# Patient Record
Sex: FEMALE | Race: WHITE | NOT HISPANIC OR LATINO | Employment: UNEMPLOYED | URBAN - NONMETROPOLITAN AREA
[De-identification: names, ages, dates, MRNs, and addresses within clinical notes are randomized per-mention and may not be internally consistent; named-entity substitution may affect disease eponyms.]

---

## 2017-04-21 ENCOUNTER — ALLSCRIPTS OFFICE VISIT (OUTPATIENT)
Dept: FAMILY MEDICINE CLINIC | Facility: CLINIC | Age: 27
End: 2017-04-21
Payer: COMMERCIAL

## 2017-04-21 PROCEDURE — T1015 CLINIC SERVICE: HCPCS | Performed by: PHYSICIAN ASSISTANT

## 2018-01-13 VITALS
HEART RATE: 67 BPM | BODY MASS INDEX: 31.24 KG/M2 | OXYGEN SATURATION: 98 % | WEIGHT: 183 LBS | TEMPERATURE: 97.3 F | SYSTOLIC BLOOD PRESSURE: 118 MMHG | HEIGHT: 64 IN | DIASTOLIC BLOOD PRESSURE: 80 MMHG | RESPIRATION RATE: 16 BRPM

## 2022-05-06 ENCOUNTER — APPOINTMENT (EMERGENCY)
Dept: CT IMAGING | Facility: HOSPITAL | Age: 32
End: 2022-05-06
Payer: COMMERCIAL

## 2022-05-06 ENCOUNTER — HOSPITAL ENCOUNTER (EMERGENCY)
Facility: HOSPITAL | Age: 32
Discharge: HOME | End: 2022-05-06
Attending: EMERGENCY MEDICINE
Payer: COMMERCIAL

## 2022-05-06 VITALS
HEART RATE: 58 BPM | BODY MASS INDEX: 23.44 KG/M2 | WEIGHT: 132.28 LBS | OXYGEN SATURATION: 100 % | DIASTOLIC BLOOD PRESSURE: 67 MMHG | SYSTOLIC BLOOD PRESSURE: 122 MMHG | TEMPERATURE: 98.1 F | RESPIRATION RATE: 16 BRPM | HEIGHT: 63 IN

## 2022-05-06 DIAGNOSIS — H53.9 VISUAL DISTURBANCE: ICD-10-CM

## 2022-05-06 DIAGNOSIS — J01.00 SINUSITIS, ACUTE MAXILLARY: Primary | ICD-10-CM

## 2022-05-06 DIAGNOSIS — R42 LIGHTHEADED: ICD-10-CM

## 2022-05-06 LAB
ANION GAP SERPL CALCULATED.3IONS-SCNC: 7 MMOL/L (ref 4–13)
BASOPHILS # BLD AUTO: 0.1 THOUSANDS/ΜL (ref 0–0.1)
BASOPHILS NFR BLD AUTO: 1 % (ref 0–1)
BILIRUB UR QL STRIP: NEGATIVE
BUN SERPL-MCNC: 8 MG/DL (ref 5–25)
CALCIUM SERPL-MCNC: 8.7 MG/DL (ref 8.3–10.1)
CHLORIDE SERPL-SCNC: 104 MMOL/L (ref 100–108)
CLARITY UR: CLEAR
CO2 SERPL-SCNC: 29 MMOL/L (ref 21–32)
COLOR UR: YELLOW
CREAT SERPL-MCNC: 0.75 MG/DL (ref 0.6–1.3)
EOSINOPHIL # BLD AUTO: 0.28 THOUSAND/ΜL (ref 0–0.61)
EOSINOPHIL NFR BLD AUTO: 4 % (ref 0–6)
ERYTHROCYTE [DISTWIDTH] IN BLOOD BY AUTOMATED COUNT: 13.5 % (ref 11.6–15.1)
EXT PREG TEST URINE: NEGATIVE
EXT. CONTROL ED NAV: NORMAL
GFR SERPL CREATININE-BSD FRML MDRD: 104 ML/MIN/1.73SQ M
GLUCOSE SERPL-MCNC: 49 MG/DL (ref 65–140)
GLUCOSE UR STRIP-MCNC: NEGATIVE MG/DL
HCT VFR BLD AUTO: 42.6 % (ref 34.8–46.1)
HGB BLD-MCNC: 13.7 G/DL (ref 11.5–15.4)
HGB UR QL STRIP.AUTO: NEGATIVE
IMM GRANULOCYTES # BLD AUTO: 0.02 THOUSAND/UL (ref 0–0.2)
IMM GRANULOCYTES NFR BLD AUTO: 0 % (ref 0–2)
KETONES UR STRIP-MCNC: NEGATIVE MG/DL
LEUKOCYTE ESTERASE UR QL STRIP: NEGATIVE
LYMPHOCYTES # BLD AUTO: 2.34 THOUSANDS/ΜL (ref 0.6–4.47)
LYMPHOCYTES NFR BLD AUTO: 29 % (ref 14–44)
MCH RBC QN AUTO: 30.7 PG (ref 26.8–34.3)
MCHC RBC AUTO-ENTMCNC: 32.2 G/DL (ref 31.4–37.4)
MCV RBC AUTO: 96 FL (ref 82–98)
MONOCYTES # BLD AUTO: 0.41 THOUSAND/ΜL (ref 0.17–1.22)
MONOCYTES NFR BLD AUTO: 5 % (ref 4–12)
NEUTROPHILS # BLD AUTO: 4.93 THOUSANDS/ΜL (ref 1.85–7.62)
NEUTS SEG NFR BLD AUTO: 61 % (ref 43–75)
NITRITE UR QL STRIP: NEGATIVE
NRBC BLD AUTO-RTO: 0 /100 WBCS
PH UR STRIP.AUTO: 6.5 [PH]
PLATELET # BLD AUTO: 256 THOUSANDS/UL (ref 149–390)
PMV BLD AUTO: 10.5 FL (ref 8.9–12.7)
POTASSIUM SERPL-SCNC: 3.6 MMOL/L (ref 3.5–5.3)
PROT UR STRIP-MCNC: NEGATIVE MG/DL
RBC # BLD AUTO: 4.46 MILLION/UL (ref 3.81–5.12)
SODIUM SERPL-SCNC: 140 MMOL/L (ref 136–145)
SP GR UR STRIP.AUTO: 1.02 (ref 1–1.03)
TSH SERPL DL<=0.05 MIU/L-ACNC: 0.34 UIU/ML (ref 0.45–4.5)
UROBILINOGEN UR QL STRIP.AUTO: 0.2 E.U./DL
WBC # BLD AUTO: 8.08 THOUSAND/UL (ref 4.31–10.16)

## 2022-05-06 PROCEDURE — 36415 COLL VENOUS BLD VENIPUNCTURE: CPT | Performed by: EMERGENCY MEDICINE

## 2022-05-06 PROCEDURE — 93005 ELECTROCARDIOGRAM TRACING: CPT

## 2022-05-06 PROCEDURE — 81025 URINE PREGNANCY TEST: CPT | Performed by: EMERGENCY MEDICINE

## 2022-05-06 PROCEDURE — 85025 COMPLETE CBC W/AUTO DIFF WBC: CPT | Performed by: EMERGENCY MEDICINE

## 2022-05-06 PROCEDURE — 80048 BASIC METABOLIC PNL TOTAL CA: CPT | Performed by: EMERGENCY MEDICINE

## 2022-05-06 PROCEDURE — 70450 CT HEAD/BRAIN W/O DYE: CPT

## 2022-05-06 PROCEDURE — 99285 EMERGENCY DEPT VISIT HI MDM: CPT

## 2022-05-06 PROCEDURE — 81003 URINALYSIS AUTO W/O SCOPE: CPT | Performed by: EMERGENCY MEDICINE

## 2022-05-06 PROCEDURE — 99285 EMERGENCY DEPT VISIT HI MDM: CPT | Performed by: EMERGENCY MEDICINE

## 2022-05-06 PROCEDURE — 84443 ASSAY THYROID STIM HORMONE: CPT | Performed by: EMERGENCY MEDICINE

## 2022-05-06 PROCEDURE — G1004 CDSM NDSC: HCPCS

## 2022-05-06 RX ORDER — AZITHROMYCIN 250 MG/1
TABLET, FILM COATED ORAL
Qty: 6 TABLET | Refills: 0 | Status: SHIPPED | OUTPATIENT
Start: 2022-05-06 | End: 2022-05-10

## 2022-05-06 RX ORDER — FLUTICASONE PROPIONATE 50 MCG
1 SPRAY, SUSPENSION (ML) NASAL DAILY
Qty: 16 G | Refills: 0 | Status: SHIPPED | OUTPATIENT
Start: 2022-05-06

## 2022-05-06 RX ADMIN — SODIUM CHLORIDE, SODIUM LACTATE, POTASSIUM CHLORIDE, AND CALCIUM CHLORIDE 1000 ML: .6; .31; .03; .02 INJECTION, SOLUTION INTRAVENOUS at 14:59

## 2022-05-06 NOTE — ED PROVIDER NOTES
History  Chief Complaint   Patient presents with    Chest Pain     pt states she has been having difficulty in her vision over the past three days  pt states she feels more tired than normal and c/o chest pain starting yesterday   Blurred Vision     Patient is a 68-year-old female states she has noted a spot described as a backwards see that is located when she looks at objects  There is no loss of vision or difficulty with her vision but states she keep seen this spot continuously  She does have a history of migraine headaches but states she does not associate this visual complaint with any headaches  Also complains of feeling lightheaded  Patient states when she stands up that she is to experience a sensation passing out  Has not had any syncopal events  No other chronic medical problems other than asthma  Patient does not drink alcohol but does smoke cigarettes and marijuana  No recent head injury  No recent chiropractic maneuvers or recent injury or illness  No history of DVT or PE  Prior to Admission Medications   Prescriptions Last Dose Informant Patient Reported? Taking? albuterol (PROVENTIL HFA,VENTOLIN HFA) 90 mcg/act inhaler   Yes Yes   Sig: Inhale 2 puffs every 6 (six) hours as needed for wheezing  Facility-Administered Medications: None       Past Medical History:   Diagnosis Date    Asthma     Migraines        Past Surgical History:   Procedure Laterality Date     SECTION         History reviewed  No pertinent family history  I have reviewed and agree with the history as documented      E-Cigarette/Vaping    E-Cigarette Use Never User      E-Cigarette/Vaping Substances     Social History     Tobacco Use    Smoking status: Current Every Day Smoker     Packs/day: 1 50    Smokeless tobacco: Never Used   Vaping Use    Vaping Use: Never used   Substance Use Topics    Alcohol use: Not Currently    Drug use: Yes     Types: Marijuana       Review of Systems Constitutional: Negative for appetite change, chills, fatigue, fever and unexpected weight change  HENT: Negative for congestion, ear pain, rhinorrhea and sore throat  Eyes: Positive for visual disturbance  Negative for pain  Respiratory: Negative for cough, chest tightness, shortness of breath and wheezing  Cardiovascular: Negative for chest pain, palpitations and leg swelling  Gastrointestinal: Negative for abdominal pain, constipation, diarrhea, nausea and vomiting  Genitourinary: Negative for difficulty urinating, dysuria, frequency, hematuria, menstrual problem, pelvic pain, vaginal bleeding and vaginal discharge  Musculoskeletal: Negative for arthralgias, back pain and neck pain  Skin: Negative for color change and rash  Neurological: Positive for light-headedness  Negative for dizziness, seizures, syncope and headaches  Psychiatric/Behavioral: Negative for confusion and sleep disturbance  All other systems reviewed and are negative  Physical Exam  Physical Exam  Vitals and nursing note reviewed  Constitutional:       General: She is not in acute distress  Appearance: She is well-developed and normal weight  She is not ill-appearing, toxic-appearing or diaphoretic  HENT:      Head: Normocephalic and atraumatic  Eyes:      General: No scleral icterus  Extraocular Movements: Extraocular movements intact  Conjunctiva/sclera: Conjunctivae normal    Neck:      Thyroid: No thyromegaly  Vascular: No JVD  Cardiovascular:      Rate and Rhythm: Normal rate and regular rhythm  Pulses:           Carotid pulses are 2+ on the right side and 2+ on the left side  Radial pulses are 2+ on the right side and 2+ on the left side  Dorsalis pedis pulses are 2+ on the right side and 2+ on the left side  Posterior tibial pulses are 2+ on the right side and 2+ on the left side  Heart sounds: Normal heart sounds  No murmur heard  No friction rub   No gallop  Pulmonary:      Effort: Pulmonary effort is normal  No respiratory distress  Breath sounds: Normal breath sounds  No decreased breath sounds, wheezing, rhonchi or rales  Chest:      Chest wall: Tenderness present  No mass or deformity  Abdominal:      Palpations: Abdomen is soft  There is no hepatomegaly or mass  Tenderness: There is no abdominal tenderness  There is no guarding or rebound  Hernia: No hernia is present  Musculoskeletal:         General: Normal range of motion  Cervical back: Normal range of motion and neck supple  Right lower leg: No tenderness  No edema  Left lower leg: No tenderness  No edema  Lymphadenopathy:      Cervical: No cervical adenopathy  Skin:     General: Skin is warm and dry  Capillary Refill: Capillary refill takes less than 2 seconds  Coloration: Skin is not cyanotic or pale  Findings: No rash  Nails: There is no clubbing  Neurological:      General: No focal deficit present  Mental Status: She is alert and oriented to person, place, and time     Psychiatric:         Mood and Affect: Mood normal          Behavior: Behavior normal          Vital Signs  ED Triage Vitals   Temperature Pulse Respirations Blood Pressure SpO2   05/06/22 1410 05/06/22 1410 05/06/22 1407 05/06/22 1410 05/06/22 1410   98 1 °F (36 7 °C) 64 16 122/67 99 %      Temp Source Heart Rate Source Patient Position - Orthostatic VS BP Location FiO2 (%)   05/06/22 1410 05/06/22 1410 05/06/22 1410 05/06/22 1410 --   Temporal Monitor Lying Left arm       Pain Score       05/06/22 1407       8           Vitals:    05/06/22 1515 05/06/22 1545 05/06/22 1600 05/06/22 1615   BP:       Pulse: 61 57 70 58   Patient Position - Orthostatic VS:             Visual Acuity      ED Medications  Medications   lactated ringers bolus 1,000 mL (0 mL Intravenous Stopped 5/6/22 1635)       Diagnostic Studies  Results Reviewed     Procedure Component Value Units Date/Time    Basic metabolic panel [[id number]]  (Abnormal) Collected: 05/06/22 1458    Lab Status: Final result Specimen: Blood from Arm, Right Updated: 05/06/22 1535     Sodium 140 mmol/L      Potassium 3 6 mmol/L      Chloride 104 mmol/L      CO2 29 mmol/L      ANION GAP 7 mmol/L      BUN 8 mg/dL      Creatinine 0 75 mg/dL      Glucose 49 mg/dL      Calcium 8 7 mg/dL      eGFR 104 ml/min/1 73sq m     Narrative:      [place] guidelines for Chronic Kidney Disease (CKD):     Stage 1 with normal or high GFR (GFR > 90 mL/min/1 73 square meters)    Stage 2 Mild CKD (GFR = 60-89 mL/min/1 73 square meters)    Stage 3A Moderate CKD (GFR = 45-59 mL/min/1 73 square meters)    Stage 3B Moderate CKD (GFR = 30-44 mL/min/1 73 square meters)    Stage 4 Severe CKD (GFR = 15-29 mL/min/1 73 square meters)    Stage 5 End Stage CKD (GFR <15 mL/min/1 73 square meters)  Note: GFR calculation is accurate only with a steady state creatinine    TSH [[id number]]  (Abnormal) Collected: 05/06/22 1458    Lab Status: Final result Specimen: Blood from Arm, Right Updated: 05/06/22 1535     TSH 3RD GENERATON 0 339 uIU/mL     Narrative:      Patients undergoing fluorescein dye angiography may retain small amounts of fluorescein in the body for 48-72 hours post procedure  Samples containing fluorescein can produce falsely depressed TSH values  If the patient had this procedure,a specimen should be resubmitted post fluorescein clearance        UA w Reflex to Microscopic w Reflex to Culture [[id number]] Collected: 05/06/22 1503    Lab Status: Final result Specimen: Urine, Clean Catch Updated: 05/06/22 1512     Color, UA Yellow     Clarity, UA Clear     Specific Gravity, UA 1 025     pH, UA 6 5     Leukocytes, UA Negative     Nitrite, UA Negative     Protein, UA Negative mg/dl      Glucose, UA Negative mg/dl      Ketones, UA Negative mg/dl      Urobilinogen, UA 0 2 E U /dl      Bilirubin, UA Negative     Blood, UA Negative CBC and differential [[id number]] Collected: 05/06/22 1458    Lab Status: Final result Specimen: Blood from Arm, Right Updated: 05/06/22 1510     WBC 8 08 Thousand/uL      RBC 4 46 Million/uL      Hemoglobin 13 7 g/dL      Hematocrit 42 6 %      MCV 96 fL      MCH 30 7 pg      MCHC 32 2 g/dL      RDW 13 5 %      MPV 10 5 fL      Platelets 370 Thousands/uL      nRBC 0 /100 WBCs      Neutrophils Relative 61 %      Immat GRANS % 0 %      Lymphocytes Relative 29 %      Monocytes Relative 5 %      Eosinophils Relative 4 %      Basophils Relative 1 %      Neutrophils Absolute 4 93 Thousands/µL      Immature Grans Absolute 0 02 Thousand/uL      Lymphocytes Absolute 2 34 Thousands/µL      Monocytes Absolute 0 41 Thousand/µL      Eosinophils Absolute 0 28 Thousand/µL      Basophils Absolute 0 10 Thousands/µL     POCT pregnancy, urine [[id number]]  (Normal) Resulted: 05/06/22 1504    Lab Status: Final result Updated: 05/06/22 1504     EXT PREG TEST UR (Ref: Negative) NEGATIVE     Control VALID                 CT head without contrast   Final Result by Tonya Quiroz MD (05/06 1546)      No acute intracranial abnormality  Small air-fluid level in the right maxillary sinus which may reflect acute sinusitis              Workstation performed: [id number]                    Procedures  ECG 12 Lead Documentation Only    Date/Time: 5/6/2022 3:23 PM  Performed by: Irineo Omalley DO  Authorized by: Irineo Omalley DO     Indications / Diagnosis:  Lightheaded  ECG reviewed by me, the ED Provider: yes    Patient location:  ED  Rate:     ECG rate:  56    ECG rate assessment: bradycardic    Rhythm:     Rhythm: sinus rhythm    Ectopy:     Ectopy: none    QRS:     QRS axis:  Normal    QRS intervals:  Normal  Conduction:     Conduction: normal    ST segments:     ST segments:  Normal  T waves:     T waves: normal               ED Course        ambulatory in the emergency department upon discharge without difficulty  MDM    Disposition  Final diagnoses:   Sinusitis, acute maxillary   Lightheaded   Visual disturbance     Time reflects when diagnosis was documented in both MDM as applicable and the Disposition within this note     Time User Action Codes Description Comment    5/6/2022  4:35 PM Jean-Pierre Filler T Add [J01 00] Sinusitis, acute maxillary     5/6/2022  4:40 PM Jean-Pierre Filler T Add [R42] Lightheaded     5/6/2022  4:40 PM Arvis Riedel Add [H53 9] Visual disturbance       ED Disposition     ED Disposition Condition Date/Time Comment    Discharge Stable Fri May 6, 2022  4:35 PM Lion Ese discharge to home/self care  Follow-up Information     Follow up With Specialties Details Why Contact Info    Osito Samuel PA-C Family Medicine, Physician Assistant Schedule an appointment as soon as possible for a visit   [address] [facility] [address]  [phone]      [facility] Ophthalmology Schedule an appointment as soon as possible for a visit   [address] [facility] [address]  [phone]            Patient's Medications   Discharge Prescriptions    AZITHROMYCIN (ZITHROMAX) 250 MG TABLET    Take 2 tablets today then 1 tablet daily x 4 days       Start Date: 5/6/2022  End Date: 5/10/2022       Order Dose: --       Quantity: 6 tablet    Refills: 0    FLUTICASONE (FLONASE) 50 MCG/ACT NASAL SPRAY    1 spray into each nostril daily       Start Date: 5/6/2022  End Date: --       Order Dose: 1 spray       Quantity: 16 g    Refills: 0       No discharge procedures on file      PDMP Review     None          ED Provider  Electronically Signed by           Ladan Mcghee DO  05/06/22 8343

## 2022-05-09 LAB
ATRIAL RATE: 68 BPM
P AXIS: 49 DEGREES
PR INTERVAL: 114 MS
QRS AXIS: 70 DEGREES
QRSD INTERVAL: 80 MS
QT INTERVAL: 412 MS
QTC INTERVAL: 438 MS
T WAVE AXIS: 69 DEGREES
VENTRICULAR RATE: 68 BPM

## 2022-05-09 PROCEDURE — 93010 ELECTROCARDIOGRAM REPORT: CPT | Performed by: INTERNAL MEDICINE

## 2022-05-11 LAB
ATRIAL RATE: 56 BPM
P AXIS: 54 DEGREES
PR INTERVAL: 120 MS
QRS AXIS: 73 DEGREES
QRSD INTERVAL: 82 MS
QT INTERVAL: 428 MS
QTC INTERVAL: 413 MS
T WAVE AXIS: 72 DEGREES
VENTRICULAR RATE: 56 BPM

## 2022-05-11 PROCEDURE — 93010 ELECTROCARDIOGRAM REPORT: CPT | Performed by: INTERNAL MEDICINE

## 2023-06-13 ENCOUNTER — OFFICE VISIT (OUTPATIENT)
Dept: URGENT CARE | Facility: CLINIC | Age: 33
End: 2023-06-13
Payer: COMMERCIAL

## 2023-06-13 VITALS
OXYGEN SATURATION: 100 % | DIASTOLIC BLOOD PRESSURE: 56 MMHG | SYSTOLIC BLOOD PRESSURE: 117 MMHG | HEIGHT: 66 IN | WEIGHT: 153 LBS | RESPIRATION RATE: 18 BRPM | BODY MASS INDEX: 24.59 KG/M2 | TEMPERATURE: 98.5 F | HEART RATE: 76 BPM

## 2023-06-13 DIAGNOSIS — R10.30 LOWER ABDOMINAL PAIN: Primary | ICD-10-CM

## 2023-06-13 LAB
SL AMB  POCT GLUCOSE, UA: NEGATIVE
SL AMB LEUKOCYTE ESTERASE,UA: NEGATIVE
SL AMB POCT BILIRUBIN,UA: NEGATIVE
SL AMB POCT BLOOD,UA: NEGATIVE
SL AMB POCT CLARITY,UA: CLEAR
SL AMB POCT COLOR,UA: YELLOW
SL AMB POCT KETONES,UA: NEGATIVE
SL AMB POCT NITRITE,UA: NEGATIVE
SL AMB POCT PH,UA: 6.5
SL AMB POCT SPECIFIC GRAVITY,UA: 1.03
SL AMB POCT URINE HCG: NEGATIVE
SL AMB POCT URINE PROTEIN: NEGATIVE
SL AMB POCT UROBILINOGEN: 0.2

## 2023-06-13 PROCEDURE — 99212 OFFICE O/P EST SF 10 MIN: CPT | Performed by: PHYSICIAN ASSISTANT

## 2023-06-13 PROCEDURE — 81002 URINALYSIS NONAUTO W/O SCOPE: CPT | Performed by: PHYSICIAN ASSISTANT

## 2023-06-13 PROCEDURE — S9088 SERVICES PROVIDED IN URGENT: HCPCS | Performed by: PHYSICIAN ASSISTANT

## 2023-06-13 PROCEDURE — 81025 URINE PREGNANCY TEST: CPT | Performed by: PHYSICIAN ASSISTANT

## 2023-06-13 NOTE — PROGRESS NOTES
[facility]        NAME: Nima Olguin is a 26 y o  female  : 1990    MRN: [id number]  DATE: 2023  TIME: 4:37 PM    Assessment and Plan   Lower abdominal pain [R10 30]  1  Lower abdominal pain  POCT urine HCG    POCT urine dip            Patient Instructions       Follow up with PCP in 3-5 days  Proceed to  ER if symptoms worsen  Chief Complaint     Chief Complaint   Patient presents with   • Abdominal Pain     Lower abdominal pain     • Heartburn         History of Present Illness       Patient presents with a 3 day hx of periumbilical and lower abd pain  Pain is worsening with time  8/10 pain  Increases with movement  Denies Fever, chills, N/V/D  Having heartburn at night  Denies pregnancy, had tubes tied  Denies urinary sx  Review of Systems   Review of Systems   Constitutional: Negative for chills and fever  Gastrointestinal: Positive for abdominal pain  Negative for diarrhea, nausea and vomiting  Genitourinary: Negative for difficulty urinating           Current Medications       Current Outpatient Medications:   •  albuterol (PROVENTIL HFA,VENTOLIN HFA) 90 mcg/act inhaler, Inhale 2 puffs every 6 (six) hours as needed for wheezing , Disp: , Rfl:   •  fluticasone (FLONASE) 50 mcg/act nasal spray, 1 spray into each nostril daily, Disp: 16 g, Rfl: 0    Current Allergies     Allergies as of 2023 - Reviewed 2023   Allergen Reaction Noted   • Aspirin Anaphylaxis 2016   • Darvon [propoxyphene] Anaphylaxis 2016   • Augmentin [amoxicillin-pot clavulanate] Vomiting 2023            The following portions of the patient's history were reviewed and updated as appropriate: allergies, current medications, past family history, past medical history, past social history, past surgical history and problem list      Past Medical History:   Diagnosis Date   • Asthma    • Migraines        Past Surgical History:   Procedure Laterality Date   •  SECTION     • "TUBAL LIGATION  2017       History reviewed  No pertinent family history  Medications have been verified  Objective   /56   Pulse 76   Temp 98 5 °F (36 9 °C)   Resp 18   Ht 5' 6\" (1 676 m)   Wt 69 4 kg (153 lb)   SpO2 100%   BMI 24 69 kg/m²   No LMP recorded  Physical Exam     Physical Exam  Vitals and nursing note reviewed  Constitutional:       Appearance: She is well-developed  HENT:      Head: Normocephalic and atraumatic  Cardiovascular:      Rate and Rhythm: Normal rate and regular rhythm  Pulmonary:      Effort: Pulmonary effort is normal    Abdominal:      Tenderness: There is abdominal tenderness in the right lower quadrant and periumbilical area  Positive signs include psoas sign  Neurological:      Mental Status: She is alert                     "

## 2023-07-21 ENCOUNTER — OFFICE VISIT (OUTPATIENT)
Dept: FAMILY MEDICINE CLINIC | Facility: CLINIC | Age: 33
End: 2023-07-21
Payer: COMMERCIAL

## 2023-07-21 VITALS
DIASTOLIC BLOOD PRESSURE: 68 MMHG | TEMPERATURE: 98.6 F | OXYGEN SATURATION: 99 % | SYSTOLIC BLOOD PRESSURE: 120 MMHG | WEIGHT: 142.6 LBS | BODY MASS INDEX: 24.34 KG/M2 | HEART RATE: 66 BPM | HEIGHT: 64 IN

## 2023-07-21 DIAGNOSIS — L40.9 PSORIASIS: ICD-10-CM

## 2023-07-21 DIAGNOSIS — R39.9 UTI SYMPTOMS: ICD-10-CM

## 2023-07-21 DIAGNOSIS — G43.109 MIGRAINE WITH AURA AND WITHOUT STATUS MIGRAINOSUS, NOT INTRACTABLE: ICD-10-CM

## 2023-07-21 DIAGNOSIS — R10.11 RUQ PAIN: ICD-10-CM

## 2023-07-21 DIAGNOSIS — F41.9 ANXIETY: ICD-10-CM

## 2023-07-21 DIAGNOSIS — J45.40 MODERATE PERSISTENT ASTHMA, UNSPECIFIED WHETHER COMPLICATED: Primary | ICD-10-CM

## 2023-07-21 PROCEDURE — 99205 OFFICE O/P NEW HI 60 MIN: CPT | Performed by: FAMILY MEDICINE

## 2023-07-21 RX ORDER — SUMATRIPTAN 25 MG/1
25 TABLET, FILM COATED ORAL ONCE AS NEEDED
Qty: 12 TABLET | Refills: 5 | Status: SHIPPED | OUTPATIENT
Start: 2023-07-21

## 2023-07-21 RX ORDER — DIAPER,BRIEF,INFANT-TODD,DISP
EACH MISCELLANEOUS 2 TIMES DAILY
Qty: 56 G | Refills: 5 | Status: SHIPPED | OUTPATIENT
Start: 2023-07-21

## 2023-07-21 RX ORDER — VENLAFAXINE HYDROCHLORIDE 37.5 MG/1
37.5 CAPSULE, EXTENDED RELEASE ORAL
Qty: 30 CAPSULE | Refills: 5 | Status: SHIPPED | OUTPATIENT
Start: 2023-07-21

## 2023-07-21 RX ORDER — ALBUTEROL SULFATE 90 UG/1
2 AEROSOL, METERED RESPIRATORY (INHALATION) EVERY 6 HOURS PRN
Qty: 6.7 G | Refills: 5 | Status: SHIPPED | OUTPATIENT
Start: 2023-07-21

## 2023-07-21 RX ORDER — ALBUTEROL SULFATE 90 UG/1
2 AEROSOL, METERED RESPIRATORY (INHALATION)
COMMUNITY

## 2023-07-21 RX ORDER — DIAPER,BRIEF,INFANT-TODD,DISP
EACH MISCELLANEOUS 2 TIMES DAILY
COMMUNITY
End: 2023-07-21 | Stop reason: SDUPTHER

## 2023-07-21 NOTE — PROGRESS NOTES
Name: Jayla Bejarano      : 1990      MRN: [id number]  Encounter Provider: Roxanne Saul MD  Encounter Date: 2023   Encounter department: [address] [facility]     1. Moderate persistent asthma, unspecified whether complicated  -     albuterol (PROVENTIL HFA,VENTOLIN HFA) 90 mcg/act inhaler; Inhale 2 puffs every 6 (six) hours as needed for wheezing    2. Psoriasis  -     hydrocortisone 0.5 % cream; Apply topically 2 (two) times a day    3. UTI symptoms  -     UA (URINE) with reflex to Scope    4. RUQ pain  -     US right upper quadrant; Future; Expected date: 2023    5. Migraine with aura and without status migrainosus, not intractable  -     SUMAtriptan (Imitrex) 25 mg tablet; Take 1 tablet (25 mg total) by mouth once as needed for migraine for up to 1 dose    6. Anxiety  -     venlafaxine (EFFEXOR-XR) 37.5 mg 24 hr capsule; Take 1 capsule (37.5 mg total) by mouth daily with breakfast           Subjective      She has 4-5 headaches a week. She has difficulty with certain smells. She has photophobia and phonophobia. She has had HA since the age of 14. Current HA worse than normal.  She has not taken a prophylactic medication. Her significant other just left the family. She has 4 children. 2 of her kids are on the Spectrum. She has RUQ pain that radiates into her right back. She has nausea with it. She has subjective fevers. She has no dysuria, frequency or urgency. Review of Systems   Gastrointestinal: Positive for abdominal pain and nausea. All other systems reviewed and are negative. Current Outpatient Medications on File Prior to Visit   Medication Sig   • albuterol (PROVENTIL HFA,VENTOLIN HFA) 90 mcg/act inhaler Inhale 2 puffs   • [DISCONTINUED] albuterol (PROVENTIL HFA,VENTOLIN HFA) 90 mcg/act inhaler Inhale 2 puffs every 6 (six) hours as needed for wheezing.    • [DISCONTINUED] hydrocortisone 0.5 % cream Apply topically 2 (two) times a day   • [DISCONTINUED] fluticasone (FLONASE) 50 mcg/act nasal spray 1 spray into each nostril daily (Patient not taking: Reported on 7/21/2023)       Objective     /68 (BP Location: Right arm, Patient Position: Sitting, Cuff Size: Standard)   Pulse 66   Temp 98.6 °F (37 °C) (Temporal)   Ht 5' 4" (1.626 m)   Wt 64.7 kg (142 lb 9.6 oz)   SpO2 99%   BMI 24.48 kg/m²     Physical Exam  Vitals and nursing note reviewed. Constitutional:       Appearance: She is well-developed and normal weight. Abdominal:      General: Abdomen is flat, scaphoid and protuberant. Bowel sounds are normal.      Palpations: Abdomen is rigid. Tenderness: There is abdominal tenderness in the right upper quadrant. Positive signs include Bowman's sign. Skin:     General: Skin is warm and dry. Neurological:      Mental Status: She is alert.        Heri Borrero MD

## 2023-09-12 ENCOUNTER — OFFICE VISIT (OUTPATIENT)
Dept: FAMILY MEDICINE CLINIC | Facility: CLINIC | Age: 33
End: 2023-09-12
Payer: COMMERCIAL

## 2023-09-12 VITALS
RESPIRATION RATE: 18 BRPM | OXYGEN SATURATION: 98 % | SYSTOLIC BLOOD PRESSURE: 102 MMHG | DIASTOLIC BLOOD PRESSURE: 64 MMHG | HEART RATE: 83 BPM | TEMPERATURE: 98.9 F | WEIGHT: 143.8 LBS | BODY MASS INDEX: 24.68 KG/M2

## 2023-09-12 DIAGNOSIS — J01.00 ACUTE NON-RECURRENT MAXILLARY SINUSITIS: ICD-10-CM

## 2023-09-12 DIAGNOSIS — J30.2 SEASONAL ALLERGIES: Primary | ICD-10-CM

## 2023-09-12 DIAGNOSIS — S16.1XXA STRAIN OF NECK MUSCLE, INITIAL ENCOUNTER: ICD-10-CM

## 2023-09-12 DIAGNOSIS — J04.0 LARYNGITIS: ICD-10-CM

## 2023-09-12 DIAGNOSIS — R05.8 PRODUCTIVE COUGH: ICD-10-CM

## 2023-09-12 PROCEDURE — 99214 OFFICE O/P EST MOD 30 MIN: CPT | Performed by: FAMILY MEDICINE

## 2023-09-12 RX ORDER — DOXYCYCLINE HYCLATE 100 MG/1
100 CAPSULE ORAL EVERY 12 HOURS SCHEDULED
Qty: 14 CAPSULE | Refills: 0 | Status: SHIPPED | OUTPATIENT
Start: 2023-09-12 | End: 2023-09-19

## 2023-09-12 RX ORDER — CETIRIZINE HYDROCHLORIDE 10 MG/1
10 TABLET ORAL DAILY
Qty: 90 TABLET | Refills: 1 | Status: SHIPPED | OUTPATIENT
Start: 2023-09-12

## 2023-09-12 RX ORDER — NAPROXEN 500 MG/1
500 TABLET ORAL 2 TIMES DAILY WITH MEALS
Qty: 60 TABLET | Refills: 0 | Status: SHIPPED | OUTPATIENT
Start: 2023-09-12 | End: 2023-10-12

## 2023-09-12 NOTE — PROGRESS NOTES
Name: Mejia Yi      : 1990      MRN: [id number]  Encounter Provider: Candida Bacon DO  Encounter Date: 2023   Encounter department: [address] [place] [address]     1. Seasonal allergies  -     cetirizine (ZyrTEC) 10 mg tablet; Take 1 tablet (10 mg total) by mouth daily    2. Laryngitis  -     doxycycline hyclate (VIBRAMYCIN) 100 mg capsule; Take 1 capsule (100 mg total) by mouth every 12 (twelve) hours for 7 days    3. Acute non-recurrent maxillary sinusitis  -     doxycycline hyclate (VIBRAMYCIN) 100 mg capsule; Take 1 capsule (100 mg total) by mouth every 12 (twelve) hours for 7 days    4. Productive cough    5. Strain of neck muscle, initial encounter  -     naproxen (Naprosyn) 500 mg tablet; Take 1 tablet (500 mg total) by mouth 2 (two) times a day with meals    we will tx with doxycycline. Has allergy to Augmentin and was hesitant to take cephalosporin. We will tx her allergies with zyrtec. She does not like medicines, nasally -- she would benefit form nasal steroid. She is not abusing her inhaler. Will use naprosyn bid for ~ 7 days for neck strain 2/2 her cough/laryngitis, etc.  Rest/hydration. Return if symptoms worsen or fail to improve. Patient/Caretaker verbalized understanding and were in agreement with today's assessment and plan. Time was taken to address any questions patient/caretaker had. Indication/Risks/Benefits of medication(s) as prescribed were discussed with the patient/caretaker. The patient verbalized understanding and agreement and elects to take medications as prescribed. Time was taken to answer any questions the patient/caretaker may have had. Chief Complaint   Patient presents with   • Laryngitis       Subjective      She is here with loss of voice for the past 4 days. It feels dry and pain. There is now associated with L trap pain/myalgia. She is a smoker -- 1/2 ppd for many years.   There is a dry cough that started yesterday. This AM, she did have production that was green/lime in nature. Her has a daughter home with allergies -- she is 7. She does have allergies as well - pt. She does not take oral antihistamine but her Primary did recommend nasal spray, she does not like this tx. No f/c/s. No n/v/d. She has intermittent asthma and is not using this more than her typical.        Review of Systems   All other systems reviewed and are negative. Current Outpatient Medications on File Prior to Visit   Medication Sig   • albuterol (PROVENTIL HFA,VENTOLIN HFA) 90 mcg/act inhaler Inhale 2 puffs   • hydrocortisone 0.5 % cream Apply topically 2 (two) times a day   • SUMAtriptan (Imitrex) 25 mg tablet Take 1 tablet (25 mg total) by mouth once as needed for migraine for up to 1 dose   • venlafaxine (EFFEXOR-XR) 37.5 mg 24 hr capsule Take 1 capsule (37.5 mg total) by mouth daily with breakfast   • albuterol (PROVENTIL HFA,VENTOLIN HFA) 90 mcg/act inhaler Inhale 2 puffs every 6 (six) hours as needed for wheezing (Patient not taking: Reported on 9/12/2023)       Objective     /64   Pulse 83   Temp 98.9 °F (37.2 °C) (Temporal)   Resp 18   Wt 65.2 kg (143 lb 12.8 oz)   LMP 08/30/2023 Comment: she is with a tubal  SpO2 98%   BMI 24.68 kg/m²     Physical Exam  Vitals and nursing note reviewed. Constitutional:       General: She is not in acute distress. Appearance: Normal appearance. HENT:      Head: Normocephalic and atraumatic. Ears:      Comments: Fluid behind ears/no bulging or erythema       Nose:      Comments: Boggy, erythematous turbinates b/l        Mouth/Throat:      Comments: Mucus streaking at the posterior oropharynx with moderate erythema   Voice is raspy on exam   Eyes:      Conjunctiva/sclera: Conjunctivae normal.      Pupils: Pupils are equal, round, and reactive to light.    Neck:      Comments: Shotty submandibular adenopathy - no pain   Cardiovascular:      Rate and Rhythm: Normal rate and regular rhythm. Heart sounds: Normal heart sounds. Pulmonary:      Effort: Pulmonary effort is normal.      Breath sounds: Normal breath sounds. No wheezing, rhonchi or rales. Musculoskeletal:         General: No swelling. Cervical back: Neck supple. Lymphadenopathy:      Cervical: No cervical adenopathy. Skin:     General: Skin is warm and dry. Neurological:      General: No focal deficit present. Mental Status: She is alert and oriented to person, place, and time. Psychiatric:         Mood and Affect: Mood normal.         Behavior: Behavior normal.         Thought Content:  Thought content normal.         Judgment: Judgment normal.       Kelly Forrester, DO

## 2023-12-20 DIAGNOSIS — F41.9 ANXIETY: ICD-10-CM

## 2023-12-20 RX ORDER — VENLAFAXINE HYDROCHLORIDE 37.5 MG/1
37.5 CAPSULE, EXTENDED RELEASE ORAL
Qty: 30 CAPSULE | Refills: 5 | Status: SHIPPED | OUTPATIENT
Start: 2023-12-20

## 2024-03-14 DIAGNOSIS — G43.109 MIGRAINE WITH AURA AND WITHOUT STATUS MIGRAINOSUS, NOT INTRACTABLE: ICD-10-CM

## 2024-03-14 DIAGNOSIS — J45.40 MODERATE PERSISTENT ASTHMA, UNSPECIFIED WHETHER COMPLICATED: Primary | ICD-10-CM

## 2024-03-14 RX ORDER — SUMATRIPTAN 25 MG/1
25 TABLET, FILM COATED ORAL ONCE AS NEEDED
Qty: 12 TABLET | Refills: 3 | Status: SHIPPED | OUTPATIENT
Start: 2024-03-14

## 2024-03-14 RX ORDER — ALBUTEROL SULFATE 90 UG/1
2 AEROSOL, METERED RESPIRATORY (INHALATION) EVERY 6 HOURS PRN
Qty: 6.7 G | Refills: 5 | Status: SHIPPED | OUTPATIENT
Start: 2024-03-14

## 2024-03-28 ENCOUNTER — OFFICE VISIT (OUTPATIENT)
Dept: FAMILY MEDICINE CLINIC | Facility: HOME HEALTHCARE | Age: 34
End: 2024-03-28
Payer: COMMERCIAL

## 2024-03-28 ENCOUNTER — TELEPHONE (OUTPATIENT)
Dept: FAMILY MEDICINE CLINIC | Facility: HOME HEALTHCARE | Age: 34
End: 2024-03-28

## 2024-03-28 VITALS
HEIGHT: 64 IN | HEART RATE: 105 BPM | TEMPERATURE: 98.4 F | OXYGEN SATURATION: 94 % | SYSTOLIC BLOOD PRESSURE: 128 MMHG | RESPIRATION RATE: 18 BRPM | WEIGHT: 152.4 LBS | BODY MASS INDEX: 26.02 KG/M2 | DIASTOLIC BLOOD PRESSURE: 70 MMHG

## 2024-03-28 DIAGNOSIS — J45.40 MODERATE PERSISTENT ASTHMA, UNSPECIFIED WHETHER COMPLICATED: ICD-10-CM

## 2024-03-28 DIAGNOSIS — G43.109 MIGRAINE WITH AURA AND WITHOUT STATUS MIGRAINOSUS, NOT INTRACTABLE: Primary | ICD-10-CM

## 2024-03-28 DIAGNOSIS — R79.89 ABNORMAL TSH: ICD-10-CM

## 2024-03-28 DIAGNOSIS — J30.2 SEASONAL ALLERGIES: ICD-10-CM

## 2024-03-28 DIAGNOSIS — Z11.59 ENCOUNTER FOR HEPATITIS C SCREENING TEST FOR LOW RISK PATIENT: ICD-10-CM

## 2024-03-28 DIAGNOSIS — L40.9 PSORIASIS: ICD-10-CM

## 2024-03-28 DIAGNOSIS — F41.9 ANXIETY: ICD-10-CM

## 2024-03-28 DIAGNOSIS — Z11.4 SCREENING FOR HIV (HUMAN IMMUNODEFICIENCY VIRUS): ICD-10-CM

## 2024-03-28 PROCEDURE — T1015 CLINIC SERVICE: HCPCS | Performed by: FAMILY MEDICINE

## 2024-03-28 RX ORDER — SUMATRIPTAN 50 MG/1
50 TABLET, FILM COATED ORAL ONCE AS NEEDED
Qty: 30 TABLET | Refills: 0 | Status: SHIPPED | OUTPATIENT
Start: 2024-03-28

## 2024-03-28 RX ORDER — ALBUTEROL SULFATE 90 UG/1
2 AEROSOL, METERED RESPIRATORY (INHALATION) EVERY 6 HOURS PRN
Qty: 6.7 G | Refills: 5 | Status: SHIPPED | OUTPATIENT
Start: 2024-03-28

## 2024-03-28 RX ORDER — DIAPER,BRIEF,INFANT-TODD,DISP
EACH MISCELLANEOUS 2 TIMES DAILY
Qty: 56 G | Refills: 5 | Status: SHIPPED | OUTPATIENT
Start: 2024-03-28

## 2024-03-28 RX ORDER — CETIRIZINE HYDROCHLORIDE 10 MG/1
10 TABLET ORAL DAILY
Qty: 90 TABLET | Refills: 1 | Status: SHIPPED | OUTPATIENT
Start: 2024-03-28

## 2024-03-28 RX ORDER — VENLAFAXINE HYDROCHLORIDE 75 MG/1
75 CAPSULE, EXTENDED RELEASE ORAL DAILY
Qty: 30 CAPSULE | Refills: 0 | Status: SHIPPED | OUTPATIENT
Start: 2024-03-28 | End: 2024-04-27

## 2024-03-28 RX ORDER — TOPIRAMATE SPINKLE 25 MG/1
25 CAPSULE ORAL DAILY
Qty: 30 CAPSULE | Refills: 0 | Status: SHIPPED | OUTPATIENT
Start: 2024-03-28 | End: 2024-04-27

## 2024-03-28 NOTE — ASSESSMENT & PLAN NOTE
Uncontrolled migraines  Reports 4-5 migraines a week  Wakes up with head pressure usually on left side, photosensitivity, nausea, no vomiting  Symptoms can last up to 3 days  Takes sumatriptan 25 mg tab when she first feels symptoms  Medication does not improve symptoms  Not pregnant -fallopian tubes removed   Plan:  Start migraine prophylactic medication; Topamax 25 mg daily  Increase migraine abortive medication; sumatriptan 25 mg to 50 mg for breakthrough migraines  Follow-up in 1 month to reassess this medication adjustment

## 2024-03-29 NOTE — ASSESSMENT & PLAN NOTE
History of anxiety  Symptoms not well-controlled  Symptoms include feeling on edge, racing thoughts, worrying about many different things, fidgeting  Taking Effexor 37.5 mg daily for 7 months  Patient states that symptoms were well-controlled when she started Effexor, but symptoms no longer well-controlled  Plan:  Increase Effexor from 37.5 mg to 75 mg daily  Follow-up in 1 month to assess this medication adjustment  Complete lab tests-CBC, CMP, TSH,  vitamin D

## 2024-03-29 NOTE — ASSESSMENT & PLAN NOTE
History of psoriasis on elbows  Symptoms well-controlled with hydrocortisone 0.5% cream  Continue current medical management

## 2024-03-29 NOTE — PROGRESS NOTES
Assessment/Plan:    Migraine with aura and without status migrainosus, not intractable  Uncontrolled migraines  Reports 4-5 migraines a week  Wakes up with head pressure usually on left side, photosensitivity, nausea, no vomiting  Symptoms can last up to 3 days  Takes sumatriptan 25 mg tab when she first feels symptoms  Medication does not improve symptoms  Not pregnant -fallopian tubes removed   Plan:  Start migraine prophylactic medication; Topamax 25 mg daily  Increase migraine abortive medication; sumatriptan 25 mg to 50 mg for breakthrough migraines  Follow-up in 1 month to reassess this medication adjustment        Asthma  History of intermittent asthma  Uses albuterol inhaler once or twice a month, usually during springtime  Symptoms well-controlled  Requesting refill of albuterol inhaler  Plan:  Continue albuterol inhaler as needed    Psoriasis  History of psoriasis on elbows  Symptoms well-controlled with hydrocortisone 0.5% cream  Continue current medical management    Anxiety  History of anxiety  Symptoms not well-controlled  Symptoms include feeling on edge, racing thoughts, worrying about many different things, fidgeting  Taking Effexor 37.5 mg daily for 7 months  Patient states that symptoms were well-controlled when she started Effexor, but symptoms no longer well-controlled  Plan:  Increase Effexor from 37.5 mg to 75 mg daily  Follow-up in 1 month to assess this medication adjustment  Complete lab tests-CBC, CMP, TSH,  vitamin D     Diagnoses and all orders for this visit:    Migraine with aura and without status migrainosus, not intractable  -     SUMAtriptan (Imitrex) 50 mg tablet; Take 1 tablet (50 mg total) by mouth once as needed for migraine for up to 30 doses  -     topiramate (TOPAMAX) 25 mg sprinkle capsule; Take 1 capsule (25 mg total) by mouth daily    Moderate persistent asthma, unspecified whether complicated  -     albuterol (PROVENTIL HFA,VENTOLIN HFA) 90 mcg/act inhaler; Inhale 2  puffs every 6 (six) hours as needed for wheezing    Seasonal allergies  -     cetirizine (ZyrTEC) 10 mg tablet; Take 1 tablet (10 mg total) by mouth daily    Psoriasis  -     hydrocortisone 0.5 % cream; Apply topically 2 (two) times a day    Abnormal TSH  -     TSH, 3rd generation with Free T4 reflex; Future    Anxiety  -     CBC and differential; Future  -     Vitamin D 25 hydroxy; Future  -     Comprehensive metabolic panel; Future  -     venlafaxine (EFFEXOR-XR) 75 mg 24 hr capsule; Take 1 capsule (75 mg total) by mouth daily    Screening for HIV (human immunodeficiency virus)  -     HIV 1/2 AG/AB w Reflex [facility] for 2 yr old and above; Future    Encounter for hepatitis C screening test for low risk patient  -     Hepatitis C antibody; Future          Subjective:      Patient ID: Bethanie Barroso is a 34 y.o. female.    Patient is a 34-year-old female with past medical history of asthma, anxiety, migraine.  Patient in clinic to [facility], previously seen in [place] by Dr. Butts.  Patient has 2 concerns today, her migraines and anxiety are not well-controlled.  Patient states that she wakes up almost daily with a migraine, roughly 5 days a week.  Migraine presents with left-sided head pressure, extreme photosensitivity, nausea without vomiting.  Patient states that symptoms can last up to 3 days.  She takes sumatriptan 25 mg when symptoms first, on.  Symptoms or not improved with this medication.  She also reports worsening Camron, she is compliant with Effexor 37.5 mg but states that she still worries a lot about many different things and has difficulty sitting still.  She is compliant with medication and reports no side effects.  She would like to increase her medication since today previously worked well, but are no longer working.  She has no risk of being pregnant since she had her fallopian tubes removed.        The following portions of the patient's history were reviewed and updated as appropriate:  "allergies, current medications, past family history, past medical history, past social history, past surgical history, and problem list.    Review of Systems   Constitutional:  Negative for chills and fever.   HENT:  Negative for ear pain and sore throat.    Eyes:  Positive for photophobia. Negative for pain and visual disturbance.   Respiratory:  Negative for cough and shortness of breath.    Cardiovascular:  Negative for chest pain and palpitations.   Gastrointestinal:  Positive for nausea. Negative for abdominal pain and vomiting.   Genitourinary:  Negative for dysuria and hematuria.   Musculoskeletal:  Negative for arthralgias and back pain.   Skin:  Negative for color change and rash.   Neurological:  Positive for headaches. Negative for seizures and syncope.   Psychiatric/Behavioral:  The patient is nervous/anxious.    All other systems reviewed and are negative.        Objective:      /70 (BP Location: Left arm, Patient Position: Sitting, Cuff Size: Adult)   Pulse 105   Temp 98.4 °F (36.9 °C)   Resp 18   Ht 5' 4\" (1.626 m)   Wt 69.1 kg (152 lb 6.4 oz)   SpO2 94%   BMI 26.16 kg/m²          Physical Exam  Vitals and nursing note reviewed.   Constitutional:       Appearance: Normal appearance.      Comments: Wearing sunglasses due to photosensitivity   HENT:      Right Ear: External ear normal.      Left Ear: External ear normal.      Nose: No rhinorrhea.      Mouth/Throat:      Comments: Poor dentition, missing majority of teeth  Eyes:      General:         Right eye: No discharge.         Left eye: No discharge.   Cardiovascular:      Rate and Rhythm: Normal rate and regular rhythm.      Heart sounds: Normal heart sounds.   Pulmonary:      Effort: Pulmonary effort is normal. No respiratory distress.      Breath sounds: Normal breath sounds.   Abdominal:      General: There is no distension.   Musculoskeletal:      Right lower leg: No edema.      Left lower leg: No edema.   Skin:     Coloration: " Skin is not jaundiced.      Findings: No erythema or rash.   Neurological:      Mental Status: She is alert. Mental status is at baseline.   Psychiatric:         Mood and Affect: Mood normal.      Comments: Difficulty sitting still, fidgety

## 2024-03-29 NOTE — ASSESSMENT & PLAN NOTE
History of intermittent asthma  Uses albuterol inhaler once or twice a month, usually during springtime  Symptoms well-controlled  Requesting refill of albuterol inhaler  Plan:  Continue albuterol inhaler as needed

## 2024-04-01 DIAGNOSIS — J45.40 MODERATE PERSISTENT ASTHMA, UNSPECIFIED WHETHER COMPLICATED: ICD-10-CM

## 2024-04-01 DIAGNOSIS — L40.9 PSORIASIS: ICD-10-CM

## 2024-04-01 DIAGNOSIS — G43.109 MIGRAINE WITH AURA AND WITHOUT STATUS MIGRAINOSUS, NOT INTRACTABLE: ICD-10-CM

## 2024-04-01 DIAGNOSIS — F41.9 ANXIETY: ICD-10-CM

## 2024-04-01 DIAGNOSIS — J30.2 SEASONAL ALLERGIES: ICD-10-CM

## 2024-04-01 RX ORDER — DIAPER,BRIEF,INFANT-TODD,DISP
EACH MISCELLANEOUS 2 TIMES DAILY
Qty: 56 G | Refills: 5 | Status: SHIPPED | OUTPATIENT
Start: 2024-04-01

## 2024-04-01 RX ORDER — VENLAFAXINE HYDROCHLORIDE 75 MG/1
75 CAPSULE, EXTENDED RELEASE ORAL DAILY
Qty: 30 CAPSULE | Refills: 0 | Status: SHIPPED | OUTPATIENT
Start: 2024-04-01 | End: 2024-05-01

## 2024-04-01 RX ORDER — CETIRIZINE HYDROCHLORIDE 10 MG/1
10 TABLET ORAL DAILY
Qty: 90 TABLET | Refills: 1 | Status: SHIPPED | OUTPATIENT
Start: 2024-04-01

## 2024-04-01 RX ORDER — TOPIRAMATE SPINKLE 25 MG/1
25 CAPSULE ORAL DAILY
Qty: 30 CAPSULE | Refills: 0 | Status: SHIPPED | OUTPATIENT
Start: 2024-04-01 | End: 2024-05-01

## 2024-04-01 RX ORDER — SUMATRIPTAN 50 MG/1
50 TABLET, FILM COATED ORAL ONCE AS NEEDED
Qty: 30 TABLET | Refills: 0 | Status: SHIPPED | OUTPATIENT
Start: 2024-04-01

## 2024-04-01 RX ORDER — ALBUTEROL SULFATE 90 UG/1
2 AEROSOL, METERED RESPIRATORY (INHALATION) EVERY 6 HOURS PRN
Qty: 6.7 G | Refills: 5 | Status: SHIPPED | OUTPATIENT
Start: 2024-04-01

## 2024-05-20 ENCOUNTER — VBI (OUTPATIENT)
Dept: ADMINISTRATIVE | Facility: OTHER | Age: 34
End: 2024-05-20

## 2024-06-03 DIAGNOSIS — F41.9 ANXIETY: ICD-10-CM

## 2024-06-04 RX ORDER — VENLAFAXINE HYDROCHLORIDE 75 MG/1
75 CAPSULE, EXTENDED RELEASE ORAL DAILY
Qty: 30 CAPSULE | Refills: 0 | Status: SHIPPED | OUTPATIENT
Start: 2024-06-04

## 2024-07-15 ENCOUNTER — HOSPITAL ENCOUNTER (EMERGENCY)
Facility: HOSPITAL | Age: 34
Discharge: HOME | End: 2024-07-15
Attending: EMERGENCY MEDICINE
Payer: COMMERCIAL

## 2024-07-15 VITALS
DIASTOLIC BLOOD PRESSURE: 56 MMHG | OXYGEN SATURATION: 96 % | TEMPERATURE: 98.3 F | HEART RATE: 80 BPM | SYSTOLIC BLOOD PRESSURE: 118 MMHG | RESPIRATION RATE: 18 BRPM

## 2024-07-15 DIAGNOSIS — K04.7 DENTAL INFECTION: Primary | ICD-10-CM

## 2024-07-15 PROCEDURE — 99284 EMERGENCY DEPT VISIT MOD MDM: CPT | Performed by: EMERGENCY MEDICINE

## 2024-07-15 PROCEDURE — 99282 EMERGENCY DEPT VISIT SF MDM: CPT

## 2024-07-15 RX ORDER — CLINDAMYCIN HYDROCHLORIDE 150 MG/1
450 CAPSULE ORAL ONCE
Status: COMPLETED | OUTPATIENT
Start: 2024-07-15 | End: 2024-07-15

## 2024-07-15 RX ORDER — LIDOCAINE HYDROCHLORIDE 20 MG/ML
15 SOLUTION OROPHARYNGEAL ONCE
Status: COMPLETED | OUTPATIENT
Start: 2024-07-15 | End: 2024-07-15

## 2024-07-15 RX ORDER — ONDANSETRON 4 MG/1
4 TABLET, ORALLY DISINTEGRATING ORAL EVERY 6 HOURS PRN
Qty: 20 TABLET | Refills: 0 | Status: SHIPPED | OUTPATIENT
Start: 2024-07-15

## 2024-07-15 RX ORDER — NAPROXEN 500 MG/1
500 TABLET ORAL ONCE
Status: COMPLETED | OUTPATIENT
Start: 2024-07-15 | End: 2024-07-15

## 2024-07-15 RX ORDER — ONDANSETRON 4 MG/1
4 TABLET, ORALLY DISINTEGRATING ORAL ONCE
Status: COMPLETED | OUTPATIENT
Start: 2024-07-15 | End: 2024-07-15

## 2024-07-15 RX ORDER — NAPROXEN 500 MG/1
500 TABLET ORAL 2 TIMES DAILY WITH MEALS
Qty: 30 TABLET | Refills: 0 | Status: SHIPPED | OUTPATIENT
Start: 2024-07-15

## 2024-07-15 RX ORDER — CHLORHEXIDINE GLUCONATE ORAL RINSE 1.2 MG/ML
15 SOLUTION DENTAL 2 TIMES DAILY
Qty: 210 ML | Refills: 0 | Status: SHIPPED | OUTPATIENT
Start: 2024-07-15 | End: 2024-07-22

## 2024-07-15 RX ORDER — CLINDAMYCIN HYDROCHLORIDE 150 MG/1
450 CAPSULE ORAL 3 TIMES DAILY
Qty: 63 CAPSULE | Refills: 0 | Status: SHIPPED | OUTPATIENT
Start: 2024-07-15 | End: 2024-07-22

## 2024-07-15 RX ADMIN — CLINDAMYCIN HYDROCHLORIDE 450 MG: 150 CAPSULE ORAL at 16:03

## 2024-07-15 RX ADMIN — NAPROXEN 500 MG: 500 TABLET ORAL at 16:03

## 2024-07-15 RX ADMIN — ONDANSETRON 4 MG: 4 TABLET, ORALLY DISINTEGRATING ORAL at 16:04

## 2024-07-15 RX ADMIN — LIDOCAINE HYDROCHLORIDE 15 ML: 20 SOLUTION ORAL; TOPICAL at 16:04

## 2024-07-15 NOTE — ED PROVIDER NOTES
History  Chief Complaint   Patient presents with    Dental Pain     Patient reports an abscess on the lower left jaw, reports noticed it about 3 days ago.      34-year-old female presents for evaluation of left lower dental pain with associated swelling.  Patient states about 3 days ago she developed an abscess.  She has had no fevers at home however does state a few nausea and vomiting episodes.  Patient has been eating a soft diet, she is not taking anything for her pain at home.        Prior to Admission Medications   Prescriptions Last Dose Informant Patient Reported? Taking?   SUMAtriptan (Imitrex) 50 mg tablet   No Yes   Sig: Take 1 tablet (50 mg total) by mouth once as needed for migraine for up to 30 doses   albuterol (PROVENTIL HFA,VENTOLIN HFA) 90 mcg/act inhaler   No Yes   Sig: Inhale 2 puffs every 6 (six) hours as needed for wheezing   cetirizine (ZyrTEC) 10 mg tablet 7/15/2024  No Yes   Sig: Take 1 tablet (10 mg total) by mouth daily   hydrocortisone 0.5 % cream   No Yes   Sig: Apply topically 2 (two) times a day   topiramate (TOPAMAX) 25 mg sprinkle capsule   No No   Sig: Take 1 capsule (25 mg total) by mouth daily   venlafaxine (EFFEXOR-XR) 75 mg 24 hr capsule   No Yes   Sig: take 1 capsule by mouth once daily      Facility-Administered Medications: None       Past Medical History:   Diagnosis Date    Asthma     Migraines     Ovarian cancer (HCC)     patient reports stage 2       Past Surgical History:   Procedure Laterality Date     SECTION      TUBAL LIGATION  2017       History reviewed. No pertinent family history.  I have reviewed and agree with the history as documented.    E-Cigarette/Vaping    E-Cigarette Use Never User      E-Cigarette/Vaping Substances     Social History     Tobacco Use    Smoking status: Every Day     Current packs/day: 0.50     Types: Cigarettes    Smokeless tobacco: Never   Vaping Use    Vaping status: Never Used   Substance Use Topics    Alcohol use: Not  Currently    Drug use: Not Currently     Types: Marijuana       Review of Systems   Constitutional:  Negative for activity change, appetite change and fever.   HENT:  Positive for dental problem and facial swelling. Negative for trouble swallowing.    Gastrointestinal:  Positive for nausea and vomiting. Negative for abdominal pain.   Genitourinary:  Negative for menstrual problem.   All other systems reviewed and are negative.      Physical Exam  Physical Exam  Vitals reviewed.   Constitutional:       General: She is not in acute distress.     Appearance: Normal appearance. She is not ill-appearing or toxic-appearing.   HENT:      Head: Normocephalic and atraumatic.      Right Ear: External ear normal.      Left Ear: External ear normal.      Mouth/Throat:      Mouth: Mucous membranes are moist.      Comments: Poor dentition globally, area of tenderness to left lower gumline around tooth 17-18, there is a protrusion that is tender, no fluctuant abscess; soft submandibular and sublingual areas, no trismus, normal phonation; small amount of left lower facial swelling without erythema  Eyes:      General: No scleral icterus.        Right eye: No discharge.         Left eye: No discharge.   Cardiovascular:      Rate and Rhythm: Normal rate.   Pulmonary:      Effort: Pulmonary effort is normal. No respiratory distress.   Musculoskeletal:         General: No deformity or signs of injury.      Right lower leg: No edema.      Left lower leg: No edema.   Skin:     General: Skin is warm.      Coloration: Skin is not jaundiced or pale.   Neurological:      General: No focal deficit present.      Mental Status: She is alert. Mental status is at baseline.         Vital Signs  ED Triage Vitals [07/15/24 1534]   Temperature Pulse Respirations Blood Pressure SpO2   98.3 °F (36.8 °C) 80 18 118/56 96 %      Temp Source Heart Rate Source Patient Position - Orthostatic VS BP Location FiO2 (%)   Temporal Monitor Sitting Left arm --       Pain Score       8           Vitals:    07/15/24 1534   BP: 118/56   Pulse: 80   Patient Position - Orthostatic VS: Sitting         Visual Acuity      ED Medications  Medications   clindamycin (CLEOCIN) capsule 450 mg (450 mg Oral Given 7/15/24 1603)   ondansetron (ZOFRAN-ODT) dispersible tablet 4 mg (4 mg Oral Given 7/15/24 1604)   Lidocaine Viscous HCl (XYLOCAINE) 2 % mucosal solution 15 mL (15 mL Swish & Spit Given 7/15/24 1604)   naproxen (NAPROSYN) tablet 500 mg (500 mg Oral Given 7/15/24 1603)       Diagnostic Studies  Results Reviewed       None                   No orders to display              Procedures  Procedures         ED Course  ED Course as of 07/16/24 0021   Mon Jul 15, 2024   1544 Blood Pressure: 118/56   1544 Temperature: 98.3 °F (36.8 °C)   1544 Temp Source: Temporal   1544 Pulse: 80   1544 Respirations: 18   1544 SpO2: 96 %                                 SBIRT 22yo+      Flowsheet Row Most Recent Value   Initial Alcohol Screen: US AUDIT-C     1. How often do you have a drink containing alcohol? 0 Filed at: 07/15/2024 1533   2. How many drinks containing alcohol do you have on a typical day you are drinking?  0 Filed at: 07/15/2024 1533   3a. Male UNDER 65: How often do you have five or more drinks on one occasion? 0 Filed at: 07/15/2024 1533   3b. FEMALE Any Age, or MALE 65+: How often do you have 4 or more drinks on one occassion? 0 Filed at: 07/15/2024 1533   Audit-C Score 0 Filed at: 07/15/2024 1533   ELIZABETH: How many times in the past year have you...    Used an illegal drug or used a prescription medication for non-medical reasons? Never Filed at: 07/15/2024 1533                      Medical Decision Making  34-year-old female presents for evaluation of dental pain.  Patient has poor dentition globally, she has a focal area of tenderness with a protrusion however this not consistent with a fluctuant abscess.  Will provide oral antibiotics, Peridex, naproxen, Zofran for her symptoms.   Patient should return to the emergency department for worsened symptoms, will provide some dental clinic follow-up information for definitive therapy.  At this time little concern for worsened infection such as Hayden's.     Risk  Prescription drug management.                 Disposition  Final diagnoses:   Dental infection     Time reflects when diagnosis was documented in both MDM as applicable and the Disposition within this note       Time User Action Codes Description Comment    7/15/2024  3:42 PM Dalia Bullock Add [K04.7] Dental infection           ED Disposition       ED Disposition   Discharge    Condition   Stable    Date/Time   Mon Jul 15, 2024 1542    Comment   Bethanie Barroso discharge to home/self care.                   Follow-up Information       Follow up With Specialties Details Why Contact Info Additional Information    [facility]  Call   [address] [address] [phone]     [facility]  Call   [address]  [facility] [id number] [phone]     [facility] Emergency Medicine  If symptoms worsen [address] [address]  [phone] [facility], [address], [place], [address]            Discharge Medication List as of 7/15/2024  3:44 PM        START taking these medications    Details   chlorhexidine (PERIDEX) 0.12 % solution Apply 15 mL to the mouth or throat 2 (two) times a day for 7 days, Starting Mon 7/15/2024, Until Mon 7/22/2024, Normal      clindamycin (CLEOCIN) 150 mg capsule Take 3 capsules (450 mg total) by mouth 3 (three) times a day for 7 days, Starting Mon 7/15/2024, Until Mon 7/22/2024, Normal      naproxen (Naprosyn) 500 mg tablet Take 1 tablet (500 mg total) by mouth 2 (two) times a day with meals, Starting Mon 7/15/2024, Normal      ondansetron (ZOFRAN-ODT) 4 mg  disintegrating tablet Take 1 tablet (4 mg total) by mouth every 6 (six) hours as needed for nausea or vomiting, Starting Mon 7/15/2024, Normal           CONTINUE these medications which have NOT CHANGED    Details   albuterol (PROVENTIL HFA,VENTOLIN HFA) 90 mcg/act inhaler Inhale 2 puffs every 6 (six) hours as needed for wheezing, Starting Mon 4/1/2024, Normal      cetirizine (ZyrTEC) 10 mg tablet Take 1 tablet (10 mg total) by mouth daily, Starting Mon 4/1/2024, Normal      hydrocortisone 0.5 % cream Apply topically 2 (two) times a day, Starting Mon 4/1/2024, Normal      SUMAtriptan (Imitrex) 50 mg tablet Take 1 tablet (50 mg total) by mouth once as needed for migraine for up to 30 doses, Starting Mon 4/1/2024, Normal      venlafaxine (EFFEXOR-XR) 75 mg 24 hr capsule take 1 capsule by mouth once daily, Starting Tue 6/4/2024, Normal      topiramate (TOPAMAX) 25 mg sprinkle capsule Take 1 capsule (25 mg total) by mouth daily, Starting Mon 4/1/2024, Until Wed 5/1/2024, Normal             No discharge procedures on file.    PDMP Review       None            ED Provider  Electronically Signed by             Dalia Bullock DO  07/16/24 0022

## 2024-11-01 ENCOUNTER — VBI (OUTPATIENT)
Dept: ADMINISTRATIVE | Facility: OTHER | Age: 34
End: 2024-11-01

## 2024-11-01 NOTE — TELEPHONE ENCOUNTER
11/01/24 10:35 AM     Chart reviewed for Pap Smear (HPV) aka Cervical Cancer Screening was/were not submitted to the patient's insurance.     Geneva Rios MA   [facility]

## 2025-07-07 DIAGNOSIS — G43.109 MIGRAINE WITH AURA AND WITHOUT STATUS MIGRAINOSUS, NOT INTRACTABLE: ICD-10-CM

## 2025-07-07 RX ORDER — SUMATRIPTAN 50 MG/1
TABLET, FILM COATED ORAL
Qty: 30 TABLET | Refills: 0 | Status: SHIPPED | OUTPATIENT
Start: 2025-07-07